# Patient Record
Sex: FEMALE | Race: ASIAN | NOT HISPANIC OR LATINO | Employment: OTHER | URBAN - METROPOLITAN AREA
[De-identification: names, ages, dates, MRNs, and addresses within clinical notes are randomized per-mention and may not be internally consistent; named-entity substitution may affect disease eponyms.]

---

## 2023-10-27 ENCOUNTER — LAB REQUISITION (OUTPATIENT)
Dept: LAB | Facility: CLINIC | Age: 73
End: 2023-10-27

## 2023-10-27 DIAGNOSIS — I10 ESSENTIAL (PRIMARY) HYPERTENSION: ICD-10-CM

## 2023-10-27 LAB
ALBUMIN SERPL BCG-MCNC: 4.3 G/DL (ref 3.5–5.2)
ALP SERPL-CCNC: 119 U/L (ref 35–104)
ALT SERPL W P-5'-P-CCNC: 18 U/L (ref 0–50)
ANION GAP SERPL CALCULATED.3IONS-SCNC: 12 MMOL/L (ref 7–15)
AST SERPL W P-5'-P-CCNC: 17 U/L (ref 0–45)
BILIRUB SERPL-MCNC: 0.8 MG/DL
BUN SERPL-MCNC: 17.9 MG/DL (ref 8–23)
CALCIUM SERPL-MCNC: 10.1 MG/DL (ref 8.8–10.2)
CHLORIDE SERPL-SCNC: 106 MMOL/L (ref 98–107)
CHOLEST SERPL-MCNC: 212 MG/DL
CREAT SERPL-MCNC: 0.72 MG/DL (ref 0.51–0.95)
DEPRECATED HCO3 PLAS-SCNC: 24 MMOL/L (ref 22–29)
EGFRCR SERPLBLD CKD-EPI 2021: 89 ML/MIN/1.73M2
GLUCOSE SERPL-MCNC: 103 MG/DL (ref 70–99)
HDLC SERPL-MCNC: 33 MG/DL
LDLC SERPL CALC-MCNC: 120 MG/DL
NONHDLC SERPL-MCNC: 179 MG/DL
POTASSIUM SERPL-SCNC: 4.1 MMOL/L (ref 3.4–5.3)
PROT SERPL-MCNC: 7 G/DL (ref 6.4–8.3)
SODIUM SERPL-SCNC: 142 MMOL/L (ref 135–145)
TRIGL SERPL-MCNC: 294 MG/DL

## 2023-10-27 PROCEDURE — 80053 COMPREHEN METABOLIC PANEL: CPT | Performed by: PHYSICIAN ASSISTANT

## 2023-10-27 PROCEDURE — 80061 LIPID PANEL: CPT | Performed by: PHYSICIAN ASSISTANT

## 2023-11-27 ENCOUNTER — LAB REQUISITION (OUTPATIENT)
Dept: LAB | Facility: CLINIC | Age: 73
End: 2023-11-27

## 2023-11-27 DIAGNOSIS — R35.0 FREQUENCY OF MICTURITION: ICD-10-CM

## 2023-11-27 PROCEDURE — 87086 URINE CULTURE/COLONY COUNT: CPT | Performed by: PHYSICIAN ASSISTANT

## 2023-11-28 LAB — BACTERIA UR CULT: NORMAL

## 2024-04-23 ENCOUNTER — TRANSFERRED RECORDS (OUTPATIENT)
Dept: HEALTH INFORMATION MANAGEMENT | Facility: CLINIC | Age: 74
End: 2024-04-23
Payer: COMMERCIAL

## 2024-04-25 ENCOUNTER — MEDICAL CORRESPONDENCE (OUTPATIENT)
Dept: HEALTH INFORMATION MANAGEMENT | Facility: CLINIC | Age: 74
End: 2024-04-25
Payer: COMMERCIAL

## 2024-04-30 ENCOUNTER — TRANSCRIBE ORDERS (OUTPATIENT)
Dept: OTHER | Age: 74
End: 2024-04-30

## 2024-04-30 DIAGNOSIS — H91.90 HEARING LOSS, UNSPECIFIED HEARING LOSS TYPE, UNSPECIFIED LATERALITY: Primary | ICD-10-CM

## 2024-06-20 ENCOUNTER — OFFICE VISIT (OUTPATIENT)
Dept: AUDIOLOGY | Facility: CLINIC | Age: 74
End: 2024-06-20
Attending: FAMILY MEDICINE
Payer: COMMERCIAL

## 2024-06-20 DIAGNOSIS — H90.6 MIXED CONDUCTIVE AND SENSORINEURAL HEARING LOSS OF BOTH EARS: ICD-10-CM

## 2024-06-20 PROCEDURE — V5014 HEARING AID REPAIR/MODIFYING: HCPCS | Performed by: AUDIOLOGIST

## 2024-06-20 PROCEDURE — 92550 TYMPANOMETRY & REFLEX THRESH: CPT | Mod: 52 | Performed by: AUDIOLOGIST

## 2024-06-20 PROCEDURE — 92557 COMPREHENSIVE HEARING TEST: CPT | Performed by: AUDIOLOGIST

## 2024-06-20 NOTE — PROGRESS NOTES
AUDIOLOGY REPORT    SUBJECTIVE:  Lara Parisi is a 73 year old female who was seen in the Audiology Clinic at the [facility] for audiologic evaluation, referred by Sofi Castellanos M.D/[facility]. The patient was accompanied today by a representative from her adult day facility and a Hmong-speaking  (Aide ROMERO).  The patient reported she was fit binaurally with Signia TERRY devices approximately five years ago while living in [place], and is interested in new devices. The left device was reported as not working, and she arrived wearing the right device only. She feels the hearing in her left ear is worse than her right ear. Very little history was available/able to be taken.     OBJECTIVE:  Abuse Screening and falls risk screening were unable to be taken (degree of hearing loss/language barrier).    Otoscopic exam indicates ears are clear of cerumen, bilaterally. Scarring was evident on both tympanic membranes.     Pure Tone Thresholds assessed using conventional audiometry with good  reliability from 250-8000 Hz bilaterally using insert earphones and circumaural headphones.     RIGHT:  moderate sloping to profound mixed hearing loss; masking dilemma encountered for bone conduction testing at 250 Hz.    LEFT:    severe sloping to profound mixed hearing loss  Maureen was negative at several frequencies.    Tympanogram:    RIGHT: normal eardrum mobility    LEFT:   normal eardrum mobility    Reflexes for 1000 Hz (reported by stimulus ear):  RIGHT: Ipsilateral is absent at frequencies tested  RIGHT: Contralateral could not be assessed due to equipment issues  LEFT:   Ipsilateral is absent at frequencies tested  LEFT:   Contralateral could not be assessed due to equipment issues    Speech Detection Threshold (SDT):    RIGHT: 55 dB HL    LEFT:   75 dB HL  Word Recognition Score:     RIGHT: 63% at 85 dB HL using  Hmong word list read by  via monitored live voice       LEFT:   93% at 95 dB HL using  Hmong word list read by  via monitored live voice      ASSESSMENT:     ICD-10-CM    1. Mixed conductive and sensorineural hearing loss of both ears  H90.6 Adult Audiology  Referral          Today s results were discussed with the patient in detail through the .     PLAN: Follow up with referring provider. ENT referral is strongly recommended due to both conductive components to hearing loss and asymmetry in hearing ability between ears. Pending medical clearance for new amplification, Ms. Parisi was encouraged to check on her amplification benefits with her health insurance carrier, to determine her financial responsibility as well as where those benefits may be used (call customer service phone number on insurance wallet card). Both hearing aids were cleaned; listening check in the right device was satisfactory; left device was dead with a new battery. No programming updates were completed, due to inability to connect to fitting software. Please call this clinic with questions regarding these results or recommendations.        Vadim Hills, [facility]-A  [place] Licensed Audiologist [id number]

## 2025-02-18 ENCOUNTER — OFFICE VISIT (OUTPATIENT)
Dept: AUDIOLOGY | Facility: CLINIC | Age: 75
End: 2025-02-18
Payer: COMMERCIAL

## 2025-02-18 DIAGNOSIS — H90.6 MIXED CONDUCTIVE AND SENSORINEURAL HEARING LOSS OF BOTH EARS: Primary | ICD-10-CM

## 2025-02-18 NOTE — PROGRESS NOTES
AUDIOLOGY REPORT    SUBJECTIVE:  Lara Parisi is a 74 year old female who was seen in the Audiology Clinic at the [facility] for audiologic evaluation, referred by Sofi Castellanos M.D. Patient is accompanied by . Patient is currently wearing Signia BiCROS hearing aids that were fit in [place] about 3-4 years ago.  She does not wear the left CROS device and only wears the right device.  She reports noise exposure working in a [employer] for a few years.  She denies otalgia, otorrhea, dizziness, tinnitus, past ear surgery, family history of hearing loss, and aural fullness.        OBJECTIVE:    Otoscopic exam indicates ears are clear of cerumen bilaterally     Pure Tone Thresholds assessed using conventional audiometry with good  reliability from 250-8000 Hz bilaterally using insert earphones and circumaural headphones     RIGHT:  moderate sloping to severe mixed hearing loss (masking dilemmas at 250 Hz and 1000 Hz)    LEFT:   severe to moderately-severe to severe mixed hearing loss     Tympanogram:    RIGHT: normal eardrum mobility    LEFT:   normal eardrum mobility    Reflexes (reported by stimulus ear):  RIGHT: Ipsilateral is present at normal levels  RIGHT: Contralateral could not be tested due to equipment limitations  LEFT:   Ipsilateral is absent at frequencies tested  LEFT:   Contralateral could not be tested due to equipment limitations    Speech Detection Threshold:    RIGHT: 40 dB HL    LEFT:   75 dB HL  Word Recognition Score:     RIGHT: 93% at 80 dB HL using monitored live voice via .    LEFT:   60% at 95 dB HL using monitored live voice via     Patient is a hearing aid candidate. Patient would like to move forward with a hearing aid evaluation today. Therefore, the patient was presented with different options for amplification to help aid in communication. Discussed styles, levels of technology and monaural vs. binaural fitting.     The hearing aid(s)  mutually chosen were:  Binaural: Aneta Jones AI 24 [facility] R  COLOR: Black  BATTERY SIZE: rechargeable  EARMOLD/TIPS: embedded earmolds with AOV vent, removal string, and canal lock  CANAL/ LENGTH: 2 Power (right) and 2 UP (left)    Bilateral earmolds were taken without incident.    ASSESSMENT:     Compared to patient's previous audiogram dated 6/20/2024, hearing has remained stable in both ears.     Reviewed purchase information and warranty information with patient. The 45 day trial period was explained to patient. The patient was given a copy of the [place] [employer] consumer brochure on purchasing hearing instruments. Patient risk factors have been provided to the patient in writing prior to the sale of the hearing aid per FDA regulation. The risk factors are also available in the User Instructional Booklet to be presented on the day of the hearing aid fitting. Hearing aids and earmolds ordered. Hearing aid evaluation completed.Today s results were discussed with the patient in detail.     NOTE: Patient requested batteries today.  Gave her 30 units of size 312 batteries today.  Insurance is billed.     PLAN:  Patient was counseled regarding hearing loss and impact on communication. Lara is scheduled to return in 2-3 weeks for a hearing aid fitting and programming. Purchase agreement will be completed on that date. Please call this clinic with questions regarding these results or recommendations.      Demetrio Sylvester., CCC-A  [place] Licensed Audiologist #[id number]

## 2025-02-19 ENCOUNTER — OFFICE VISIT (OUTPATIENT)
Dept: OTOLARYNGOLOGY | Facility: CLINIC | Age: 75
End: 2025-02-19
Payer: COMMERCIAL

## 2025-02-19 ENCOUNTER — HOSPITAL ENCOUNTER (OUTPATIENT)
Dept: CT IMAGING | Facility: HOSPITAL | Age: 75
Discharge: HOME | End: 2025-02-19
Attending: OTOLARYNGOLOGY
Payer: COMMERCIAL

## 2025-02-19 DIAGNOSIS — H90.6 MIXED CONDUCTIVE AND SENSORINEURAL HEARING LOSS OF BOTH EARS: ICD-10-CM

## 2025-02-19 DIAGNOSIS — H90.6 MIXED CONDUCTIVE AND SENSORINEURAL HEARING LOSS OF BOTH EARS: Primary | ICD-10-CM

## 2025-02-19 PROCEDURE — 70480 CT ORBIT/EAR/FOSSA W/O DYE: CPT

## 2025-02-19 PROCEDURE — 99203 OFFICE O/P NEW LOW 30 MIN: CPT | Performed by: OTOLARYNGOLOGY

## 2025-02-19 RX ORDER — ALENDRONATE SODIUM 70 MG/1
70 TABLET ORAL
COMMUNITY

## 2025-02-19 RX ORDER — PSEUDOEPHED/ACETAMINOPH/DIPHEN 30MG-500MG
TABLET ORAL
COMMUNITY
Start: 2024-12-06

## 2025-02-19 NOTE — PROGRESS NOTES
CHIEF COMPLAINT: Patient presents with:  Hearing Problem: Medical clearance for hearing aids. Currently wears hearing aids but left device is not working, looking to replace. Has had for about 4 years. No other concerns.          HISTORY OF PRESENT ILLNESS    Lara was seen at the behest of SELF for hearing loss.   No history of ear surgery.  Wears HA on right that is four years old.             REVIEW OF SYSTEMS    Review of Systems as per HPI and PMHx, otherwise 10 system review system are negative.       ALLERGIES    Patient has no known allergies.    CURRENT MEDICATIONS      Current Outpatient Medications:     acetaminophen (TYLENOL) 500 MG tablet, TAKE 1-2 TABLET BY MOUTH FOUR TIMES A DAY AS NEEDED FOR PAIN MAX. OF 6 TABS PER DAY, Disp: , Rfl:     alendronate (FOSAMAX) 70 MG tablet, Take 70 mg by mouth every 7 days., Disp: , Rfl:      PAST MEDICAL HISTORY    PAST MEDICAL HISTORY: No past medical history on file.    PAST SURGICAL HISTORY    PAST SURGICAL HISTORY: No past surgical history on file.    FAMILY  HISTORY    FAMILY HISTORY: No family history on file.    SOCIAL HISTORY    SOCIAL HISTORY:   Social History     Tobacco Use    Smoking status: Never    Smokeless tobacco: Never   Substance Use Topics    Alcohol use: Not on file        PHYSICAL EXAM    HEAD: Normal appearance and symmetry:  No cutaneous lesions.      NECK:  supple     EARS:    Right:   Monomeric central portion of TM   LEFT:   Monomeric central portion of TM    EYES:  EOMI    CN VII/XII:  intact     NOSE:     Dorsum:   straight  Septum:  midline  Mucosa:  moist        ORAL CAVITY/OROPHARYNX:     Lips:  Normal.  Tongue: normal, midline  Mucosa:   no lesions     NECK:  Trachea:  midline.              Thyroid:  normal              Adenopathy:  none        NEURO:   Alert and Oriented     GAIT AND STATION:  normal     RESPIRATORY:   Symmetry and Respiratory effort     PSYCH:  Normal mood and affect     SKIN:   warm and dry          IMPRESSION:    Encounter Diagnosis   Name Primary?    Mixed conductive and sensorineural hearing loss of both ears Yes          RECOMMENDATIONS:    Patient is medically cleared for hearing aids  Return annually for hearing test

## 2025-02-19 NOTE — LETTER
2/19/2025      Lara Parisi  [address] [place] [address]      Dear Colleague,    Thank you for referring your patient, Lara Parisi, to the [facility]. Please see a copy of my visit note below.    CHIEF COMPLAINT: Patient presents with:  Hearing Problem: Medical clearance for hearing aids. Currently wears hearing aids but left device is not working, looking to replace. Has had for about 4 years. No other concerns.          HISTORY OF PRESENT ILLNESS    Lara was seen at the behest of SELF for hearing loss.   No history of ear surgery.  Wears HA on right that is four years old.             REVIEW OF SYSTEMS    Review of Systems as per HPI and PMHx, otherwise 10 system review system are negative.       ALLERGIES    Patient has no known allergies.    CURRENT MEDICATIONS      Current Outpatient Medications:      acetaminophen (TYLENOL) 500 MG tablet, TAKE 1-2 TABLET BY MOUTH FOUR TIMES A DAY AS NEEDED FOR PAIN MAX. OF 6 TABS PER DAY, Disp: , Rfl:      alendronate (FOSAMAX) 70 MG tablet, Take 70 mg by mouth every 7 days., Disp: , Rfl:      PAST MEDICAL HISTORY    PAST MEDICAL HISTORY: No past medical history on file.    PAST SURGICAL HISTORY    PAST SURGICAL HISTORY: No past surgical history on file.    FAMILY  HISTORY    FAMILY HISTORY: No family history on file.    SOCIAL HISTORY    SOCIAL HISTORY:   Social History     Tobacco Use     Smoking status: Never     Smokeless tobacco: Never   Substance Use Topics     Alcohol use: Not on file        PHYSICAL EXAM    HEAD: Normal appearance and symmetry:  No cutaneous lesions.      NECK:  supple     EARS:    Right:   Monomeric central portion of TM   LEFT:   Monomeric central portion of TM    EYES:  EOMI    CN VII/XII:  intact     NOSE:     Dorsum:   straight  Septum:  midline  Mucosa:  moist        ORAL CAVITY/OROPHARYNX:     Lips:  Normal.  Tongue: normal, midline  Mucosa:   no lesions     NECK:  Trachea:  midline.              Thyroid:   normal              Adenopathy:  none        NEURO:   Alert and Oriented     GAIT AND STATION:  normal     RESPIRATORY:   Symmetry and Respiratory effort     PSYCH:  Normal mood and affect     SKIN:   warm and dry         IMPRESSION:    Encounter Diagnosis   Name Primary?     Mixed conductive and sensorineural hearing loss of both ears Yes          RECOMMENDATIONS:    Patient is medically cleared for hearing aids  Return annually for hearing test      Again, thank you for allowing me to participate in the care of your patient.        Sincerely,        Italo Romano MD    Electronically signed

## 2025-03-06 ENCOUNTER — APPOINTMENT (OUTPATIENT)
Dept: INTERPRETER SERVICES | Facility: CLINIC | Age: 75
End: 2025-03-06
Payer: COMMERCIAL

## 2025-03-20 ENCOUNTER — OFFICE VISIT (OUTPATIENT)
Dept: AUDIOLOGY | Facility: CLINIC | Age: 75
End: 2025-03-20
Payer: COMMERCIAL

## 2025-03-20 DIAGNOSIS — H90.6 MIXED CONDUCTIVE AND SENSORINEURAL HEARING LOSS OF BOTH EARS: Primary | ICD-10-CM

## 2025-03-20 NOTE — PATIENT INSTRUCTIONS

## 2025-03-20 NOTE — PROGRESS NOTES
AUDIOLOGY REPORT - HEARING AID FITTING    SUBJECTIVE: Lara Parisi, a 74 year old female, was seen in the Audiology Clinic at [facility] today for a Binaural hearing aid fitting. Patient is accompanied by . Patient is currently wearing Signia BiCROS hearing aids that were fit in [place] about 3-4 years ago.  She does not wear the left CROS device and only wears the right device.     Audiogram on 2/18/2025 showed moderate sloping to severe mixed hearing loss in the right ear and severe to moderately-severe to severe mixed hearing loss in the left ear.  The patient was given medical clearance to pursue amplification by Dr. Italo Romano in ENT.    OBJECTIVE:  Prior to fitting, a hearing aid check was performed to ensure device functionality. The hearing aid conformity evaluation was completed.The hearing aids were placed and they provided a good fit. Real-ear-probe-microphone measurements were completed on the TickPick system and were a good match to NAL-NL2 target with soft sounds audible, moderate sounds comfortable, and loud sounds below discomfort. UCLs are verified through maximum power output measures and demonstrate appropriate limiting of loud inputs. Ms. Parisi was oriented to proper hearing aid use, care, cleaning (no water, dry brush), batteries (size rechargeable, insertion/removal, toxicity, low-battery signal), aid insertion/removal, user booklet, warranty information, storage cases, and other hearing aid details. The patient confirmed understanding of hearing aid use and care, and showed proper insertion of hearing aid and batteries while in the office today. Ms. Parisi reported good volume and sound quality today.    Patient is set at adaptation level 1 per her request and given a volume control. She did well.  Will teach her how to change wax traps at next visit.    She does state the left ear doesn't sound super clear, but is willing to try this for a few weeks.  If she  is not happy with the sound quality, she states she may want to go back to the GotoTel hearing aid system.     EAR(S) FIT: Binaural  MA HEARING AID MAKE: Right: Aneta Llanes [facility] R tech black; Left: Aneta Llanes [facility] R    tech vitaliy    MA HEARING AID MODEL #: Right: [id number]; Left: [id number]  HEARING AID STYLE: Right: TERRY; Left: TERRY  EARMOLDS: Right: Aneta Custom Cased SnapFit 2.0, canal lock SN: [id number]; Left:  Aneta Custom Cased SnapFit 2.0, canal lock  SN: [id number]  SERIAL NUMBERS: Right: [id number]; Left: [id number]  WARRANTY END DATE: Right: 5/30/2028; Left:: 5/30/2028       ASSESSMENT: Binaural Aneta Llanes [facility] hearing aid fitting completed today. Verification measures were performed. The 45 day trial period was explained to patient, and they expressed understanding. Ms. Parisi signed the Hearing Aid Purchase Agreement and was given a copy, as well as details on her hearing aids. Patient was counseled that exact out of pocket amounts cannot be determined for hearing aid claims being sent to insurance. Any insurance coverage information presented to the patient is an estimate only, and is not a guarantee of payment. Patient has been advised to check with their own insurance.    PLAN: Ms. Parisi will return for follow-up in 2-3 weeks for a hearing aid review appointment. Please call this clinic with questions regarding today s appointment.    Demetrio Sylvester., CCC-A  [place] Licensed Audiologist #[id number]

## 2025-04-24 ENCOUNTER — TELEPHONE (OUTPATIENT)
Dept: AUDIOLOGY | Facility: CLINIC | Age: 75
End: 2025-04-24

## 2025-04-24 NOTE — TELEPHONE ENCOUNTER
Called patient's daughter twice. Left voicemail stating that Lara missed her initial follow up appointment for her new hearing aids today. There were new hearing aids and a new  that were ordered for the patient and it was planned to swap these out at today's appointment, due to issues with her current hearing aid not charging correctly. The new hearing aids are still at the clinic. Left voicemail asking them to call back to re-scheduled the missed initial check appointment today. Left call back number.    Vadim Jones, CCC-A  [place] Licensed Audiologist #[id number]

## 2025-06-18 NOTE — PROGRESS NOTES
AUDIOLOGY REPORT    SUBJECTIVE: Lara Parisi is a 74 year old female who was seen in the Audiology Clinic at the [facility] [place] on 6/19/2025 for a hearing aid check, accompanied by a [facility] . Previous results 02/18/2025 showed right moderate sloping to severe mixed loss and left moderate-severe to severe mixed loss. The patient has been seen previously in this clinic and was fit with binaural [facility] 24 [facility] hearing aids 03/20/2025. She was not able to make it in for his initial follow-up visit, and returns for the first time following the fitting today. Today, the patient reported that she has been using the hearing aids for part of each day, but then the battery will die and she will remove the aids and switch to her old right Signia aid. She wonders whether there is something wrong with the . She reports that when she is wearing the hearing aids, she can hear the sound louder, but it is not clear and she cannot hear the voices of people further away from her. She is also unsure whether she should be wearing the left aid or not, as she knows that her hearing is worse on that side.    OBJECTIVE:   Otoscopy revealed clear canals, bilaterally.     We had a long discussion to figure out what is going on with the charging. It turns out that she is taking the hearing aids off of the  as soon as they are fully charged (light turns solid), and putting them in the hard case. Informed her that the aids are supposed to stay on the , with it plugged in to the outlet, for the entire night. Reiterated this numerous times - there is nothing wrong with the , but the aids need to stay on the  the whole night, even once they are fully charged. Datalogging shows 7-8 hours of use per day, but it is unclear how long per day she has actually been wearing the new aids versus the old one.    Based on patient report, the following changes were made:  1) The aids were  "brought from Experience Level 1 to Experience Level 3 for an overall increase in gain. Lara noticed the change and kept asking whether they should be this loud, but did not seem bothered by the loudness of the sound. Explained that this is the ideal loudness for best hearing based on her hearing loss. She reported hearing an echo to voices (ours and her own) and that there was a \"tsk tsk\" type of noise anytime she heard other voices.  2) Decreased gain for soft sounds at mid-frequencies to reduce the compression ratio.  3) Reduced low frequency gain by decreasing occlusion. Following, the patient reported the echo was gone, but continued to hear the \"tsk tsk\" sound with speech.  4) Tried numerous temporary changes (further decreased lows, increased gain at 1.1 kHz, decreased high frequency gain at various frequency bands, etc.), but none reduced or eliminated the sound she was describing.  5) Experimented with her wearing only the right aid versus the left to see whether the poor word recognition/neural function on the left side may be the cause of the atypical perception of the sound, but this did not seem to make a difference.    As the patient was tolerating the volume level well and was hearing and understanding well at this setting, decided to leave the programming like this and have her give it a try for a couple weeks to see if she acclimates to the sound. Also recommended her spending part of the time using both aids and part of the time using only the right aid to see if she hears better one way or the other. Patient agreed.    ASSESSMENT: Hearing Aid Check today. Changes to the hearing aids as described above.    PLAN: Lara will return in a few weeks for follow up to evaluate how she is hearing with the hearing aids after consistent use with the changes made today and correct charging, as well as to evaluate whether she should use the right aid only (with consideration of trying to add a CROS) versus both " aids. Please call this clinic with any questions regarding today s appointment.      Vadim Chaney, [facility]-A  [place] Licensed Audiologist #[id number]

## 2025-06-19 ENCOUNTER — OFFICE VISIT (OUTPATIENT)
Dept: AUDIOLOGY | Facility: CLINIC | Age: 75
End: 2025-06-19
Payer: COMMERCIAL

## 2025-06-19 DIAGNOSIS — H90.6 MIXED CONDUCTIVE AND SENSORINEURAL HEARING LOSS OF BOTH EARS: Primary | ICD-10-CM

## 2025-08-14 ENCOUNTER — TELEPHONE (OUTPATIENT)
Dept: AUDIOLOGY | Facility: CLINIC | Age: 75
End: 2025-08-14
Payer: COMMERCIAL